# Patient Record
Sex: FEMALE | Race: WHITE | URBAN - METROPOLITAN AREA
[De-identification: names, ages, dates, MRNs, and addresses within clinical notes are randomized per-mention and may not be internally consistent; named-entity substitution may affect disease eponyms.]

---

## 2017-01-01 ENCOUNTER — OFFICE VISIT - RIVER FALLS (OUTPATIENT)
Dept: FAMILY MEDICINE | Facility: CLINIC | Age: 77
End: 2017-01-01

## 2017-01-01 ENCOUNTER — COMMUNICATION - RIVER FALLS (OUTPATIENT)
Dept: FAMILY MEDICINE | Facility: CLINIC | Age: 77
End: 2017-01-01

## 2017-01-01 LAB
CREAT SERPL-MCNC: 3.74 MG/DL (ref 0.57–1.11)
CREAT SERPL-MCNC: 4.29 MG/DL (ref 0.6–0.93)
CREAT SERPL-MCNC: 4.53 MG/DL (ref 0.6–0.93)
GLUCOSE BLD-MCNC: 104 MG/DL (ref 65–100)
GLUCOSE BLD-MCNC: 108 MG/DL (ref 65–99)
GLUCOSE BLD-MCNC: 109 MG/DL (ref 65–99)

## 2022-02-16 NOTE — PROGRESS NOTES
ELYSE YOUNG     : 1940  MRN: [id number]  [facility] Visit  Primary Care Physician: Ike Ha MD  S: The patient has been tolerating PO and her weight has been stable.  The surgeon has given permission to remove her J/G tube.  O: She is alert and attentive.  She is in no acute distress.  There are no sutures noted around the tube.  The balloon was deflated and it was removed without difficulty, and it is intact.  They will do daily dressing changes.  A: Removal of J/G tube.  P: As above.  D:  2017  T:  2017  Lexa Adan PA-C/dougie  c:  [facility]

## 2022-02-16 NOTE — PROGRESS NOTES
ELYSE YOUNG :  1940 MRN:  [id number]  [facility] Visit  S: The resident denies any concerns or complaints.  She is fatigued.  The staff notices she is not particularly active.  It is unclear what her long term plans will be.  She had been hospitalized and found to have significant weakness and inability to care for herself at home.  She is very slowly progressing.  It is noted that she is not particularly aggressive in participating in her own cares.  She has a care conference coming up and staff will be addressing this with her.    O: She is alert and cooperative.  Heart regular rate and rhythm. Lungs clear to auscultation.    A: 1.  DVT.    2.  Deconditioning.    P: Continue rehab treatment.  The nursing home staff will be working with the resident to figure out what her goals are.    D:  2017  T:  2017 Ike Ha MD/sq    c: [facility]

## 2022-02-16 NOTE — PROGRESS NOTES
ELYSE YOUNG      : 1940   MRN: [id number]  [facility] Visit  Primary Care Physician:  Ike Ha MD  S: The resident is going home today.  There are some concerns about her ability to follow through for her many serious health conditions.  The patient had lab work performed the end of last week.  Her hemoglobin was stable but significantly low at 6.9.  Her creatinine was elevated over 4.  They have an appointment for her to see Dr. Olguin, nephrologist, in [place] tomorrow.  However, she states she will not go to this appointment.  She has multiple speciality appointments in the next week to ten days scheduled in the [facility].  She would not commit to whether or not she will keep any of those appointments.  She also needs to have close followup for her warfarin therapy and INRs.  She understands significant adverse health events and/or death if she does not follow through on these serious medical conditions.  She understands these risks and is willing to accept them but is very anxious to go home.  D:  2017  T:  2017   Lexa Adan PA-C/dougie  c:  [facility]

## 2022-02-16 NOTE — PROGRESS NOTES
Paged [phone] and call back [phone]. ?Hemoglobin 6.8 creatinine 4.2. ?Talked with the physician's assistant and the nursing home. ?Her hemoglobin when discharged from hospital 7.1 and had renal failure. ?The low hemoglobin is likely from her renal failure. ?Did let the nursing home know that she could go to the emergency room if she wanted, otherwise follow-up with the nephrologist early next week.